# Patient Record
Sex: FEMALE | Race: BLACK OR AFRICAN AMERICAN | Employment: FULL TIME | URBAN - METROPOLITAN AREA
[De-identification: names, ages, dates, MRNs, and addresses within clinical notes are randomized per-mention and may not be internally consistent; named-entity substitution may affect disease eponyms.]

---
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
## 2022-08-12 ENCOUNTER — OFFICE VISIT (OUTPATIENT)
Dept: OBGYN CLINIC | Age: 22
End: 2022-08-12
Payer: MEDICARE

## 2022-08-12 VITALS
BODY MASS INDEX: 30.71 KG/M2 | SYSTOLIC BLOOD PRESSURE: 112 MMHG | WEIGHT: 214 LBS | HEART RATE: 93 BPM | DIASTOLIC BLOOD PRESSURE: 70 MMHG | TEMPERATURE: 98.1 F

## 2022-08-12 DIAGNOSIS — Z01.419 ENCOUNTER FOR ANNUAL ROUTINE GYNECOLOGICAL EXAMINATION: Primary | ICD-10-CM

## 2022-08-12 DIAGNOSIS — Z20.2 POSSIBLE EXPOSURE TO STD: ICD-10-CM

## 2022-08-12 DIAGNOSIS — Z32.00 ENCOUNTER FOR PREGNANCY TEST, RESULT UNKNOWN: ICD-10-CM

## 2022-08-12 LAB — GONADOTROPIN, CHORIONIC (HCG) QUANT: <5 MIU/ML

## 2022-08-12 PROCEDURE — 99385 PREV VISIT NEW AGE 18-39: CPT | Performed by: OBSTETRICS & GYNECOLOGY

## 2022-08-12 PROCEDURE — 36415 COLL VENOUS BLD VENIPUNCTURE: CPT | Performed by: OBSTETRICS & GYNECOLOGY

## 2022-08-12 ASSESSMENT — ENCOUNTER SYMPTOMS
DIARRHEA: 0
NAUSEA: 0
SHORTNESS OF BREATH: 0
ABDOMINAL PAIN: 0
VOMITING: 0
CONSTIPATION: 0

## 2022-08-12 NOTE — PROGRESS NOTES
Annual Exam      CC:   Chief Complaint   Patient presents with    New Patient    Gynecologic Exam       HPI:  23 y.o. Betty Ballard presents for her gynecologic annual exam. Doing well today, no complaints. Has had some pelvic pain since delivery about a year ago. Patient seen and examined. Review of Systems:   Review of Systems   Respiratory:  Negative for shortness of breath. Cardiovascular:  Negative for chest pain. Gastrointestinal:  Negative for abdominal pain, constipation, diarrhea, nausea and vomiting. Genitourinary:  Negative for difficulty urinating, dysuria, menstrual problem, vaginal bleeding and vaginal discharge. Neurological:  Negative for headaches.      Primary Care Physician: Ria Mandujano MD    Obstetric History  OB History    Para Term  AB Living   1 1 1     1   SAB IAB Ectopic Molar Multiple Live Births             1      # Outcome Date GA Lbr Augustine/2nd Weight Sex Delivery Anes PTL Lv   1 Term 21 37w5d  6 lb 3 oz (2.807 kg) F Vag-Spont EPI  YOLETTE       Gynecologic History  Menstrual History:  LMP: 22  Menstrual pattern: q28-30d, 5d, moderate flow, no clots/cramps, occasionally skips a month  Sexual History:  Contraception: none  Currently is sexually active  Remote history of STIs -- remote h/o chlamydia and trichomonas  No sexual problems  Pap History:  Last pap smear: n/a  History of abnormal pap smears: n/a    Medical History:  Past Medical History:   Diagnosis Date    Asthma     mild    Chlamydia     Enuresis     Nosebleed     history of    Proteinuria     Seasonal allergies     Sleep apnea     STD (sexually transmitted disease)        Surgical History:  Past Surgical History:   Procedure Laterality Date    TONSILLECTOMY AND ADENOIDECTOMY Bilateral 16       Medications:  Current Outpatient Medications   Medication Sig Dispense Refill    Benzocaine-Menthol (CEPACOL SORE THROAT) 15-2.6 MG LOZG lozenge Take 1 lozenge by mouth every 2 hours as needed for Sore Throat (Patient not taking: Reported on 8/12/2022) 20 lozenge 0    ibuprofen (ADVIL;MOTRIN) 600 MG tablet Take 1 tablet by mouth every 6 hours as needed for Pain (Patient not taking: Reported on 8/12/2022) 30 tablet 0    acetaminophen (TYLENOL) 325 MG tablet Take 325 mg by mouth daily as needed for Pain. (Patient not taking: Reported on 8/12/2022)       No current facility-administered medications for this visit. Allergies:  No Known Allergies    Social History:  Social History     Socioeconomic History    Marital status: Single     Spouse name: None    Number of children: None    Years of education: None    Highest education level: None   Tobacco Use    Smoking status: Never    Smokeless tobacco: Never   Vaping Use    Vaping Use: Never used   Substance and Sexual Activity    Alcohol use: No    Drug use: Yes     Types: Marijuana Ctahy Amble)    Sexual activity: Yes     Partners: Male       Family History:  Family History   Problem Relation Age of Onset    Heart Surgery Maternal Grandmother     High Blood Pressure Maternal Grandfather     High Blood Pressure Mother     Asthma Sister        See above, otherwise denies personal/family history of cervical, uterine, ovarian, vulvar, breast, or colon cancers. Objective: Body mass index is 30.71 kg/m². /70 (Site: Right Upper Arm, Position: Sitting, Cuff Size: Large Adult)   Pulse 93   Temp 98.1 °F (36.7 °C) (Infrared)   Wt 214 lb (97.1 kg)   LMP 07/26/2022 (Exact Date)   BMI 30.71 kg/m²     Exam:   Physical Exam  Exam conducted with a chaperone present. Constitutional:       Appearance: She is well-developed. HENT:      Head: Normocephalic and atraumatic. Cardiovascular:      Rate and Rhythm: Normal rate and regular rhythm. Pulmonary:      Effort: Pulmonary effort is normal. No respiratory distress. Chest:   Breasts:     Right: Normal. No mass, nipple discharge or skin change. Left: Normal. No mass, nipple discharge or skin change. Abdominal:      General: There is no distension. Palpations: Abdomen is soft. Tenderness: There is no abdominal tenderness. There is no guarding or rebound. Genitourinary:     Comments: Pelvic exam: VULVA: normal appearing vulva with no masses, tenderness or lesions, VAGINA: normal appearing vagina with normal color and discharge, no lesions, CERVIX: normal appearing cervix without discharge or lesions, UTERUS: uterus is normal size, shape, consistency and nontender, ADNEXA: normal adnexa in size, nontender and no masses  Musculoskeletal:      Cervical back: Normal range of motion. Neurological:      Mental Status: She is alert and oriented to person, place, and time. Assessment/Plan:  23 y.o.  presenting for her annual exam:    1. Encounter for annual routine gynecological examination  Discussed age appropriate screening recommendations, pap smear not yet indicated. Discussed breast self awareness, STI screening as below. 2. Possible exposure to STD  - HIV Screen  - Syphilis Antibody Cascading Reflex  - Hep C AB RLFX HCV PCR-A  - C.trachomatis N.gonorrhoeae DNA  - Vaginal Pathogens Probes *A    3. Encounter for pregnancy test, result unknown  Discussed BCM, pt declines today.    - HCG, QUANTITATIVE, PREGNANCY      Dispo: PRN or for annual exam  Oswald Nuñez MD

## 2022-08-13 LAB
CANDIDA SPECIES, DNA PROBE: ABNORMAL
GARDNERELLA VAGINALIS, DNA PROBE: ABNORMAL
HIV AG/AB: NORMAL
HIV ANTIGEN: NORMAL
HIV-1 ANTIBODY: NORMAL
HIV-2 AB: NORMAL
TOTAL SYPHILLIS IGG/IGM: NORMAL
TRICHOMONAS VAGINALIS DNA: ABNORMAL

## 2022-08-14 LAB
HEPATITIS C VIRUS AB BY CIA INDEX: 0.02 IV
HEPATITIS C VIRUS AB BY CIA INTERPRETATION: NEGATIVE

## 2022-08-17 ENCOUNTER — PATIENT MESSAGE (OUTPATIENT)
Dept: OBGYN CLINIC | Age: 22
End: 2022-08-17

## 2022-08-17 LAB
C TRACH DNA GENITAL QL NAA+PROBE: NEGATIVE
N. GONORRHOEAE DNA: NEGATIVE

## 2022-08-18 RX ORDER — METRONIDAZOLE 500 MG/1
500 TABLET ORAL 2 TIMES DAILY
Qty: 14 TABLET | Refills: 0 | Status: SHIPPED | OUTPATIENT
Start: 2022-08-18 | End: 2022-08-25

## 2024-03-20 ENCOUNTER — OFFICE VISIT (OUTPATIENT)
Dept: OBGYN CLINIC | Age: 24
End: 2024-03-20
Payer: COMMERCIAL

## 2024-03-20 VITALS
OXYGEN SATURATION: 97 % | HEIGHT: 70 IN | SYSTOLIC BLOOD PRESSURE: 124 MMHG | BODY MASS INDEX: 32.5 KG/M2 | HEART RATE: 73 BPM | WEIGHT: 227 LBS | DIASTOLIC BLOOD PRESSURE: 76 MMHG

## 2024-03-20 DIAGNOSIS — L73.1 INGROWN HAIR: ICD-10-CM

## 2024-03-20 DIAGNOSIS — B00.9 HSV-1 INFECTION: Primary | ICD-10-CM

## 2024-03-20 PROCEDURE — G8484 FLU IMMUNIZE NO ADMIN: HCPCS | Performed by: OBSTETRICS & GYNECOLOGY

## 2024-03-20 PROCEDURE — G8427 DOCREV CUR MEDS BY ELIG CLIN: HCPCS | Performed by: OBSTETRICS & GYNECOLOGY

## 2024-03-20 PROCEDURE — 1036F TOBACCO NON-USER: CPT | Performed by: OBSTETRICS & GYNECOLOGY

## 2024-03-20 PROCEDURE — 99213 OFFICE O/P EST LOW 20 MIN: CPT | Performed by: OBSTETRICS & GYNECOLOGY

## 2024-03-20 PROCEDURE — G8417 CALC BMI ABV UP PARAM F/U: HCPCS | Performed by: OBSTETRICS & GYNECOLOGY

## 2024-03-20 ASSESSMENT — PATIENT HEALTH QUESTIONNAIRE - PHQ9
1. LITTLE INTEREST OR PLEASURE IN DOING THINGS: NOT AT ALL
SUM OF ALL RESPONSES TO PHQ QUESTIONS 1-9: 0
SUM OF ALL RESPONSES TO PHQ9 QUESTIONS 1 & 2: 0
2. FEELING DOWN, DEPRESSED OR HOPELESS: NOT AT ALL
SUM OF ALL RESPONSES TO PHQ QUESTIONS 1-9: 0
2. FEELING DOWN, DEPRESSED OR HOPELESS: NOT AT ALL
SUM OF ALL RESPONSES TO PHQ9 QUESTIONS 1 & 2: 0
1. LITTLE INTEREST OR PLEASURE IN DOING THINGS: NOT AT ALL

## 2024-03-20 NOTE — PROGRESS NOTES
Return Gyn Office Visit    CC:   Chief Complaint   Patient presents with    Vaginal Discharge     Tested positive for HSV 1 with Lab [facility] blood work        HPI:  24 y.o.  who presents to office for STI check. Went to lab to get STI check, came back positive for HSV1 and unsure how (HSV IgG positive).     Also having some dry skin in her vaginal area. Concerned about genital warts. No pain, no discharge. ?able blood. Been there for a couple weeks, no new products when they started. Did come up after she shaved.    Objective:  /76 (Site: Right Upper Arm, Position: Sitting, Cuff Size: Medium Adult)   Pulse 73   Ht 1.778 m (5' 10\")   Wt 103 kg (227 lb)   LMP 2024   SpO2 97%   BMI 32.57 kg/m²   Physical Exam  Cardiovascular:      Rate and Rhythm: Normal rate.   Pulmonary:      Effort: Pulmonary effort is normal. No respiratory distress.   Abdominal:      Palpations: Abdomen is soft.      Tenderness: There is no abdominal tenderness.   Genitourinary:      Skin:     General: Skin is warm and dry.   Neurological:      Mental Status: She is alert.         Assessment/Plan   Diagnosis Orders   1. HSV-1 infection        2. Ingrown hair          Patient with recent +HSV1 IgG on bloodwork, reviewed implications of this, return precautions. No need for treatmetn at this time given she has never had an outbreak or cold sore, would treat PRN  - ingrown hair, symptomatic relief     Dispo: PRN or for annual exam  Joana Emery MD

## 2024-05-01 ENCOUNTER — PATIENT MESSAGE (OUTPATIENT)
Dept: OBGYN CLINIC | Age: 24
End: 2024-05-01

## 2024-05-14 ENCOUNTER — OFFICE VISIT (OUTPATIENT)
Dept: OBGYN CLINIC | Age: 24
End: 2024-05-14
Payer: COMMERCIAL

## 2024-05-14 VITALS
DIASTOLIC BLOOD PRESSURE: 68 MMHG | BODY MASS INDEX: 32.96 KG/M2 | SYSTOLIC BLOOD PRESSURE: 133 MMHG | HEART RATE: 76 BPM | HEIGHT: 70 IN | WEIGHT: 230.2 LBS | TEMPERATURE: 97.4 F

## 2024-05-14 DIAGNOSIS — Z20.2 POSSIBLE EXPOSURE TO STD: ICD-10-CM

## 2024-05-14 DIAGNOSIS — Z01.419 ENCOUNTER FOR ANNUAL ROUTINE GYNECOLOGICAL EXAMINATION: Primary | ICD-10-CM

## 2024-05-14 PROCEDURE — 99395 PREV VISIT EST AGE 18-39: CPT | Performed by: OBSTETRICS & GYNECOLOGY

## 2024-05-14 PROCEDURE — 99459 PELVIC EXAMINATION: CPT | Performed by: OBSTETRICS & GYNECOLOGY

## 2024-05-14 ASSESSMENT — ENCOUNTER SYMPTOMS
ABDOMINAL PAIN: 0
DIARRHEA: 0
CONSTIPATION: 0
SHORTNESS OF BREATH: 0
NAUSEA: 0
VOMITING: 0

## 2024-05-14 NOTE — PROGRESS NOTES
Annual Exam      CC:   Chief Complaint   Patient presents with    Annual Exam    Vaginal Discharge     Pt states she had vaginal odor, discharge, and has had itching       HPI:  24 y.o.  presents for her gynecologic annual exam. Has some vaginal discharge, had some yellow stringy discharge a couple weeks ago, better now. No odor, no vaginal itching or burning.     Patient seen and examined.     Review of Systems:   Review of Systems   Respiratory:  Negative for shortness of breath.    Cardiovascular:  Negative for chest pain.   Gastrointestinal:  Negative for abdominal pain, constipation, diarrhea, nausea and vomiting.   Genitourinary:  Positive for vaginal discharge. Negative for difficulty urinating, dysuria, menstrual problem and vaginal bleeding.   Neurological:  Positive for headaches.       Primary Care Physician: Bouchra Julian, APRN - CNP    Obstetric History  OB History    Para Term  AB Living   1 1 1     1   SAB IAB Ectopic Molar Multiple Live Births             1      # Outcome Date GA Lbr Augustine/2nd Weight Sex Delivery Anes PTL Lv   1 Term 21 37w5d  2.807 kg (6 lb 3 oz) F Vag-Spont EPI  YOLETTE       Gynecologic History  Menstrual History:  LMP: 24  Menstrual pattern: q28-30d, 5d, moderate flow, no clots/cramps  Sexual History:  Contraception: none  Currently is sexually active  Remote history of STIs  No sexual problems  Pap History:  Last pap smear: 23 NILM  History of abnormal pap smears: denies    Medical History:  Past Medical History:   Diagnosis Date    Asthma     mild    Chlamydia Not sure    Gonorrhea Not sure    Hypertension 2021    Gestasional hypertention    Seasonal allergies     zyrtec prn    Sleep apnea     resolved after tonsillectomy       Surgical History:  Past Surgical History:   Procedure Laterality Date    TONSILLECTOMY AND ADENOIDECTOMY Bilateral 2014       Medications:  Current Outpatient Medications   Medication Sig Dispense Refill

## 2024-05-15 LAB
C TRACH DNA CVX QL NAA+PROBE: NEGATIVE
CANDIDA DNA VAG QL NAA+PROBE: NORMAL
G VAGINALIS DNA SPEC QL NAA+PROBE: NORMAL
N GONORRHOEA DNA CERV MUCUS QL NAA+PROBE: NEGATIVE
T VAGINALIS DNA VAG QL NAA+PROBE: NORMAL

## 2024-05-21 ENCOUNTER — OFFICE VISIT (OUTPATIENT)
Dept: OBGYN CLINIC | Age: 24
End: 2024-05-21
Payer: COMMERCIAL

## 2024-05-21 VITALS
HEART RATE: 82 BPM | BODY MASS INDEX: 32.64 KG/M2 | WEIGHT: 228 LBS | TEMPERATURE: 98.9 F | DIASTOLIC BLOOD PRESSURE: 84 MMHG | OXYGEN SATURATION: 98 % | SYSTOLIC BLOOD PRESSURE: 140 MMHG | HEIGHT: 70 IN

## 2024-05-21 DIAGNOSIS — N92.6 ABNORMAL MENSES: Primary | ICD-10-CM

## 2024-05-21 PROCEDURE — G8417 CALC BMI ABV UP PARAM F/U: HCPCS | Performed by: OBSTETRICS & GYNECOLOGY

## 2024-05-21 PROCEDURE — 99213 OFFICE O/P EST LOW 20 MIN: CPT | Performed by: OBSTETRICS & GYNECOLOGY

## 2024-05-21 PROCEDURE — G8427 DOCREV CUR MEDS BY ELIG CLIN: HCPCS | Performed by: OBSTETRICS & GYNECOLOGY

## 2024-05-21 PROCEDURE — 1036F TOBACCO NON-USER: CPT | Performed by: OBSTETRICS & GYNECOLOGY

## 2024-05-21 NOTE — PROGRESS NOTES
Return Gyn Office Visit    CC:   Chief Complaint   Patient presents with    Vaginal Bleeding     Pt states She was having period and cramping that is heavier than she normal has been. Pt states she had ended her menstrual cycle and started bleeding a week later        HPI:  24 y.o.  who presents to office for abnormal vaginal bleeding. Seen a week ago for her annual exam, was having some discharge at that time and some pink discharge as well.     LMP 24. Started bleeding heavier like a period 24, bled for 3 days but not as heavy today. No big clots. No new medications, no increased stress. Some cramping with ovulation and getting off her period also.     Objective:  BP (!) 140/84   Pulse 82   Temp 98.9 °F (37.2 °C) (Temporal)   Ht 1.778 m (5' 10\")   Wt 103.4 kg (228 lb)   LMP 2024 (Exact Date)   SpO2 98%   BMI 32.71 kg/m²   Physical Exam  HENT:      Head: Normocephalic.   Cardiovascular:      Rate and Rhythm: Normal rate.   Pulmonary:      Effort: Pulmonary effort is normal. No respiratory distress.   Abdominal:      Palpations: Abdomen is soft.      Tenderness: There is no abdominal tenderness.   Skin:     General: Skin is warm and dry.   Neurological:      General: No focal deficit present.      Mental Status: She is alert.   Psychiatric:         Mood and Affect: Mood normal.       Labs:   GCCT/trich neg, BV/yeast also negative    Imaging:   PELVIC ULTRASOUND without DOPPLER INTERROGATION   NON OB     DATE: 2024     PHYSICIAN: JANIA Emery M.D.      SONOGRAPHER: CANDELARIA [facility]     INDICATION: abnormal uterine bleeding     TYPE OF SCAN: vaginal     FINDINGS:    The cul de sac is normal. No free fluid appreciated.  The cervix is normal and not enlarged.  Nabothian cyst/s is not noted within the uterine cervix.  The uterus measures 9.38 cm x 4.89 cm x 3.81 cm.    The uterus is anteverted.  The endometrium measures 4.99 mm.   The myometrium is homogeneous in appearance. No uterine

## 2025-03-27 ENCOUNTER — HOSPITAL ENCOUNTER (EMERGENCY)
Age: 25
Discharge: HOME | End: 2025-03-27
Attending: EMERGENCY MEDICINE

## 2025-03-27 VITALS
SYSTOLIC BLOOD PRESSURE: 147 MMHG | HEIGHT: 70 IN | DIASTOLIC BLOOD PRESSURE: 94 MMHG | HEART RATE: 95 BPM | BODY MASS INDEX: 33.01 KG/M2 | OXYGEN SATURATION: 100 % | WEIGHT: 230.6 LBS | TEMPERATURE: 99.7 F | RESPIRATION RATE: 18 BRPM

## 2025-03-27 DIAGNOSIS — B96.89 BV (BACTERIAL VAGINOSIS): Primary | ICD-10-CM

## 2025-03-27 DIAGNOSIS — N76.0 BV (BACTERIAL VAGINOSIS): Primary | ICD-10-CM

## 2025-03-27 LAB
BACTERIA GENITAL QL WET PREP: NORMAL
BACTERIA URNS QL MICRO: ABNORMAL /HPF
BILIRUB UR QL STRIP.AUTO: NEGATIVE
CLARITY UR: ABNORMAL
CLUE CELLS SPEC QL WET PREP: NORMAL
COLOR UR: YELLOW
EPI CELLS #/AREA URNS AUTO: 3 /HPF (ref 0–5)
EPI CELLS SPEC QL WET PREP: NORMAL
GLUCOSE UR STRIP.AUTO-MCNC: NEGATIVE MG/DL
HCG UR QL: NEGATIVE
HGB UR QL STRIP.AUTO: ABNORMAL
HYALINE CASTS #/AREA URNS AUTO: 0 /LPF (ref 0–8)
KETONES UR STRIP.AUTO-MCNC: ABNORMAL MG/DL
LEUKOCYTE ESTERASE UR QL STRIP.AUTO: ABNORMAL
NITRITE UR QL STRIP.AUTO: NEGATIVE
PH UR STRIP.AUTO: 5.5 [PH] (ref 5–8)
PROT UR STRIP.AUTO-MCNC: 30 MG/DL
RBC CLUMPS #/AREA URNS AUTO: 190 /HPF (ref 0–4)
RBC SPEC QL WET PREP: NORMAL
SP GR UR STRIP.AUTO: 1.02 (ref 1–1.03)
SPECIMEN SOURCE FLD: NORMAL
T VAGINALIS GENITAL QL WET PREP: NORMAL
UA COMPLETE W REFLEX CULTURE PNL UR: YES
UA DIPSTICK W REFLEX MICRO PNL UR: YES
URN SPEC COLLECT METH UR: ABNORMAL
UROBILINOGEN UR STRIP-ACNC: 1 E.U./DL
WBC #/AREA URNS AUTO: 19 /HPF (ref 0–5)
WBC SPEC QL WET PREP: NORMAL
YEAST GENITAL QL WET PREP: NORMAL

## 2025-03-27 PROCEDURE — 99283 EMERGENCY DEPT VISIT LOW MDM: CPT

## 2025-03-27 PROCEDURE — 84703 CHORIONIC GONADOTROPIN ASSAY: CPT

## 2025-03-27 PROCEDURE — 81001 URINALYSIS AUTO W/SCOPE: CPT

## 2025-03-27 PROCEDURE — 87210 SMEAR WET MOUNT SALINE/INK: CPT

## 2025-03-27 PROCEDURE — 87086 URINE CULTURE/COLONY COUNT: CPT

## 2025-03-27 RX ORDER — METRONIDAZOLE 500 MG/1
500 TABLET ORAL 2 TIMES DAILY
Qty: 14 TABLET | Refills: 0 | Status: SHIPPED | OUTPATIENT
Start: 2025-03-27 | End: 2025-04-03

## 2025-03-27 ASSESSMENT — PAIN - FUNCTIONAL ASSESSMENT
PAIN_FUNCTIONAL_ASSESSMENT: 0-10
PAIN_FUNCTIONAL_ASSESSMENT: 0-10

## 2025-03-27 ASSESSMENT — PAIN DESCRIPTION - DESCRIPTORS: DESCRIPTORS: ACHING;DISCOMFORT

## 2025-03-27 ASSESSMENT — LIFESTYLE VARIABLES
HOW OFTEN DO YOU HAVE A DRINK CONTAINING ALCOHOL: NEVER
HOW MANY STANDARD DRINKS CONTAINING ALCOHOL DO YOU HAVE ON A TYPICAL DAY: PATIENT DOES NOT DRINK

## 2025-03-27 ASSESSMENT — PAIN SCALES - GENERAL
PAINLEVEL_OUTOF10: 0
PAINLEVEL_OUTOF10: 4

## 2025-03-27 ASSESSMENT — PAIN DESCRIPTION - LOCATION: LOCATION: ABDOMEN

## 2025-03-27 NOTE — ED PROVIDER NOTES
[facility] EMERGENCY DEPARTMENT    CHIEF COMPLAINT  Vaginal Itching (Pt from home c/o vaginal itching/burning x1 week with lower abdominal pain, urinary frequency starting x3 days ago. Pt states she started her period today and wasn't supposed to start until Sunday. Pt denies discharge, odor. )       HISTORY OF PRESENT ILLNESS  Fabian Hu is a 25 y.o. female who presents to the ED with 1 week of vaginal itching/burning, 3 days of urinary frequency and intermittent lower abdominal pain.  States that menstrual period started early.  She has not spoke to start till next week and that started today.  Denies abnormal discharge or odor.  Denies fever, nausea, vomiting, diarrhea, constipation.  Initially thought she had a yeast infection.    I have reviewed the following from the nursing documentation:    Past Medical History:   Diagnosis Date    Asthma     mild    Chlamydia Not sure    Gonorrhea Not sure    Hypertension April 2021    Gestasional hypertention    Seasonal allergies     zyrtec prn    Sleep apnea     resolved after tonsillectomy     Past Surgical History:   Procedure Laterality Date    TONSILLECTOMY AND ADENOIDECTOMY Bilateral 06/02/2014     Family History   Problem Relation Age of Onset    Heart Surgery Maternal Grandmother         Heart pacer    High Blood Pressure Maternal Grandfather     Hypertension Maternal Grandfather     High Blood Pressure Mother     Hypertension Mother     Asthma Sister      Social History     Socioeconomic History    Marital status: Single     Spouse name: Not on file    Number of children: Not on file    Years of education: Not on file    Highest education level: Not on file   Occupational History    Not on file   Tobacco Use    Smoking status: Never    Smokeless tobacco: Never   Vaping Use    Vaping status: Never Used   Substance and Sexual Activity    Alcohol use: No    Drug use: Yes     Frequency: 1.0 times per week     Types: Marijuana (Weed)    Sexual

## 2025-03-27 NOTE — DISCHARGE INSTR - COC
Continuity of Care Form    Patient Name: Fabian Hu   :  2000  MRN:  [id number]    Admit date:  3/27/2025  Discharge date:  ***    Code Status Order: No Order   Advance Directives:     Admitting Physician:  No admitting provider for patient encounter.  PCP: Bouchra Julian, APRN - CNP    Discharging Nurse: ***  Discharging Hospital Unit/Room#: P51/RAZ51-A  Discharging Unit Phone Number: ***    Emergency Contact:   Extended Emergency Contact Information  Primary Emergency Contact: Jessica León  Address: [address], [place] [id number] [place] [address]  Home Phone: [phone]  Mobile Phone: [phone]  Relation: Parent  Secondary Emergency Contact: Jody León  Address: [address] [id number] [facility]  Home Phone: [phone]  Relation: Grandparent    Past Surgical History:  Past Surgical History:   Procedure Laterality Date    TONSILLECTOMY AND ADENOIDECTOMY Bilateral 2014       Immunization History:     There is no immunization history on file for this patient.    Active Problems:  Patient Active Problem List   Diagnosis Code    Proteinuria R80.9    Nonorganic enuresis F98.0       Isolation/Infection:   Isolation            No Isolation          Patient Infection Status    None to display              Nurse Assessment:  Last Vital Signs: BP (!) 147/94   Pulse 95   Temp 99.7 °F (37.6 °C) (Oral)   Resp 18   Ht 1.778 m (5' 10\")   Wt 104.6 kg (230 lb 9.6 oz)   LMP 2025 (Exact Date)   SpO2 100%   BMI 33.09 kg/m²     Last documented pain score (0-10 scale): Pain Level: 0  Last Weight:   Wt Readings from Last 1 Encounters:   25 104.6 kg (230 lb 9.6 oz)     Mental Status:  {IP PT MENTAL STATUS:}    IV Access:  {[facility] IV ACCESS:048460786}    Nursing Mobility/ADLs:  Walking   {CHP DME ADLs:644430353}  Transfer  {CHP DME ADLs:261476106}  Bathing  {CHP DME ADLs:436226916}  Dressing  {CHP DME

## 2025-03-28 ENCOUNTER — RESULTS FOLLOW-UP (OUTPATIENT)
Dept: EMERGENCY DEPT | Age: 25
End: 2025-03-28

## 2025-03-28 LAB — BACTERIA UR CULT: NORMAL
